# Patient Record
Sex: FEMALE
[De-identification: names, ages, dates, MRNs, and addresses within clinical notes are randomized per-mention and may not be internally consistent; named-entity substitution may affect disease eponyms.]

---

## 2019-07-12 VITALS — DIASTOLIC BLOOD PRESSURE: 70 MMHG | SYSTOLIC BLOOD PRESSURE: 170 MMHG

## 2019-07-12 LAB
APTT PPP: 27.9 SEC (ref 26.3–35.5)
BASOPHILS NFR BLD AUTO: 0.6 % (ref 0–5)
BUN SERPL-MCNC: 39 MG/DL (ref 7–18)
CHLORIDE SERPL-SCNC: 106 MMOL/L (ref 101–111)
CO2 SERPL-SCNC: 26 MMOL/L (ref 21–32)
CREAT SERPL-MCNC: 1.3 MG/DL (ref 0.5–1.5)
EOSINOPHIL NFR BLD AUTO: 1.8 % (ref 0–8)
ERYTHROCYTE [DISTWIDTH] IN BLOOD BY AUTOMATED COUNT: 14.5 % (ref 11–15.5)
GFR SERPL CREATININE-BSD FRML MDRD: 42 ML/MIN (ref 60–?)
GLUCOSE SERPL-MCNC: 88 MG/DL (ref 70–105)
HCT VFR BLD AUTO: 34.9 % (ref 36–48)
INR PPP: 1 (ref 0.85–1.15)
LYMPHOCYTES NFR SPEC AUTO: 29.3 % (ref 21–51)
MCH RBC QN AUTO: 30.8 PG (ref 27–33)
MCHC RBC AUTO-ENTMCNC: 33.1 G/DL (ref 32–36)
MCV RBC AUTO: 92.9 FL (ref 79–99)
MONOCYTES NFR BLD AUTO: 9 % (ref 3–13)
NEUTROPHILS NFR BLD AUTO: 59.3 % (ref 40–77)
NRBC BLD MANUAL-RTO: 0 % (ref 0–0.19)
PH UR STRIP: 6 [PH] (ref 5–8)
PLATELET # BLD AUTO: 146 K/UL (ref 130–400)
POTASSIUM SERPL-SCNC: 4 MMOL/L (ref 3.5–5.1)
PROTHROMBIN TIME: 10.5 SEC (ref 9.6–11.6)
RBC # BLD AUTO: 3.75 MIL/UL (ref 4–5.5)
RBC #/AREA URNS HPF: (no result) /HPF (ref 0–1)
SODIUM SERPL-SCNC: 141 MMOL/L (ref 136–145)
SP GR UR STRIP: 1.02 (ref 1–1.03)
UROBILINOGEN UR STRIP-MCNC: 0.2 MG/DL (ref 0.2–1)
WBC # BLD AUTO: 7 K/UL (ref 4.8–10.8)
WBC #/AREA URNS HPF: (no result) /HPF (ref 0–1)

## 2019-07-12 NOTE — NUR
LABS

INFORMED MACARIO VILLELA OF ABNORMAL BUN/CREA. NO ORDERS RECEIVED. PROCEED WITH PLANNED 
PROCEDURE.

## 2019-07-15 ENCOUNTER — HOSPITAL ENCOUNTER (OUTPATIENT)
Dept: HOSPITAL 90 - DAH | Age: 79
Discharge: HOME | End: 2019-07-15
Attending: INTERNAL MEDICINE
Payer: MEDICARE

## 2019-07-15 VITALS — SYSTOLIC BLOOD PRESSURE: 182 MMHG | DIASTOLIC BLOOD PRESSURE: 100 MMHG

## 2019-07-15 VITALS — DIASTOLIC BLOOD PRESSURE: 50 MMHG | SYSTOLIC BLOOD PRESSURE: 110 MMHG

## 2019-07-15 VITALS — SYSTOLIC BLOOD PRESSURE: 122 MMHG | DIASTOLIC BLOOD PRESSURE: 57 MMHG

## 2019-07-15 VITALS — WEIGHT: 183.4 LBS | HEIGHT: 64 IN | BODY MASS INDEX: 31.31 KG/M2

## 2019-07-15 VITALS — DIASTOLIC BLOOD PRESSURE: 52 MMHG | SYSTOLIC BLOOD PRESSURE: 113 MMHG

## 2019-07-15 VITALS — SYSTOLIC BLOOD PRESSURE: 103 MMHG | DIASTOLIC BLOOD PRESSURE: 46 MMHG

## 2019-07-15 VITALS — DIASTOLIC BLOOD PRESSURE: 54 MMHG | SYSTOLIC BLOOD PRESSURE: 116 MMHG

## 2019-07-15 VITALS — SYSTOLIC BLOOD PRESSURE: 91 MMHG | DIASTOLIC BLOOD PRESSURE: 49 MMHG

## 2019-07-15 VITALS — DIASTOLIC BLOOD PRESSURE: 56 MMHG | SYSTOLIC BLOOD PRESSURE: 118 MMHG

## 2019-07-15 VITALS — SYSTOLIC BLOOD PRESSURE: 162 MMHG | DIASTOLIC BLOOD PRESSURE: 71 MMHG

## 2019-07-15 DIAGNOSIS — I25.118: Primary | ICD-10-CM

## 2019-07-15 DIAGNOSIS — Z90.710: ICD-10-CM

## 2019-07-15 DIAGNOSIS — I48.0: ICD-10-CM

## 2019-07-15 DIAGNOSIS — Z98.890: ICD-10-CM

## 2019-07-15 DIAGNOSIS — Z79.01: ICD-10-CM

## 2019-07-15 DIAGNOSIS — E78.5: ICD-10-CM

## 2019-07-15 DIAGNOSIS — Z90.49: ICD-10-CM

## 2019-07-15 DIAGNOSIS — I10: ICD-10-CM

## 2019-07-15 DIAGNOSIS — Z82.49: ICD-10-CM

## 2019-07-15 DIAGNOSIS — F41.9: ICD-10-CM

## 2019-07-15 DIAGNOSIS — Z79.899: ICD-10-CM

## 2019-07-15 DIAGNOSIS — Z72.89: ICD-10-CM

## 2019-07-15 DIAGNOSIS — Z86.718: ICD-10-CM

## 2019-07-15 PROCEDURE — 99157 MOD SED OTHER PHYS/QHP EA: CPT

## 2019-07-15 PROCEDURE — 36415 COLL VENOUS BLD VENIPUNCTURE: CPT

## 2019-07-15 PROCEDURE — 99156 MOD SED OTH PHYS/QHP 5/>YRS: CPT

## 2019-07-15 PROCEDURE — 80048 BASIC METABOLIC PNL TOTAL CA: CPT

## 2019-07-15 PROCEDURE — 85025 COMPLETE CBC W/AUTO DIFF WBC: CPT

## 2019-07-15 PROCEDURE — 85610 PROTHROMBIN TIME: CPT

## 2019-07-15 PROCEDURE — 85730 THROMBOPLASTIN TIME PARTIAL: CPT

## 2019-07-15 PROCEDURE — 71045 X-RAY EXAM CHEST 1 VIEW: CPT

## 2019-07-15 PROCEDURE — 81001 URINALYSIS AUTO W/SCOPE: CPT

## 2019-07-15 PROCEDURE — 93458 L HRT ARTERY/VENTRICLE ANGIO: CPT

## 2019-07-15 PROCEDURE — 93005 ELECTROCARDIOGRAM TRACING: CPT

## 2019-07-15 NOTE — NUR
PATIENT DISCHARGED



DISCHARGE INSTRUCTIONS EXPLAINED TO PATIENT AND DAUGHTER (SHELLI CASTANEDA). BOTH PATIENT AND 
DAUGHTER VERBALIZED UNDERSTANDING. PATIENT AAOX3, NO C/O PAIN AT THIS TIME. FEMORAL SITE 
DRESSING IS DRY AND INTACT, AREA SOFT TO TOUCH. PATIENT DISCHARGED VIA WHEELCHAIR AND 
ASSISTED INTO PRIVATE VEHICLE.

## 2019-07-15 NOTE — NUR
PATIENT RETURNED FROM PROCEDURE



PATIENT BROUGHT BACK FROM CATH LAB VIA STRETCHER BY BARBI GASPAR AND JULIO C TOMLINSON RN. PATIENT 
ALERT, ORIENTED AND DROWSY AT THIS TIME. RIGHT FEMORAL SITE WITH 2X2 GAUZE AND TRANSPARENT 
TAPE IS DRY AND INTACT. NO BLEEDING NOTED, AND AREA IS SOFT UPON PALPATION. PEDAL PULSES 
STRONG BILATERALLY. PATIENT LYING FLAT AND INSTRUCTED TO KEEP RIGHT LEG STRAIGHT AND FLAT 
FOR 4 HOURS. PATIENT VERBALIZED UNDERSTANDING. PATIENT'S DAUGHTER AT BEDSIDE.